# Patient Record
Sex: MALE | Race: WHITE | NOT HISPANIC OR LATINO | Employment: FULL TIME | URBAN - METROPOLITAN AREA
[De-identification: names, ages, dates, MRNs, and addresses within clinical notes are randomized per-mention and may not be internally consistent; named-entity substitution may affect disease eponyms.]

---

## 2022-12-30 ENCOUNTER — E-VISIT (OUTPATIENT)
Dept: URGENT CARE | Facility: CLINIC | Age: 32
End: 2022-12-30

## 2022-12-30 DIAGNOSIS — K04.7 DENTAL INFECTION: Primary | ICD-10-CM

## 2022-12-31 ENCOUNTER — NURSE TRIAGE (OUTPATIENT)
Dept: NURSING | Facility: CLINIC | Age: 32
End: 2022-12-31

## 2022-12-31 NOTE — PATIENT INSTRUCTIONS
Dear Dez Crouch,    We are sorry you are not feeling well. Based on the responses you provided, it is recommended that you be seen in-person in urgent care so we can better evaluate your symptoms. Please click here to find the nearest urgent care location to you.   You will not be charged for this Visit. Thank you for trusting us with your care.    Sana Mares MD

## 2022-12-31 NOTE — TELEPHONE ENCOUNTER
"C/o L sided lower tooth pain x5 days. Only tooth he has left. Swelling on L side of face. No fever. Says ibuprofen and Tylenol do not help. Cold pack helps a little. Wants virtual visit to try to get abx. However, he scheduled [facility] yesterday and note states provider informed pt he needs in-person visit for his sx (see Dr Mares's note dated 12/30) Advised see dentist w/i 24 hrs. If not able to see dental, see medical provider. It is holiday henrique after 5 pm. Gave ph# for [facility] but they are likely closed by now and tomorrow. [facility] closed now also. A few open tomorrow. They do not have a car. [facility] too far, they said. Declined ED. They are going to try to get telehealth visit w/ [facility].     Reason for Disposition    [1] Face is swollen AND [2] no fever    Additional Information    Negative: Shock suspected (e.g., cold/pale/clammy skin, too weak to stand, low BP, rapid pulse)    Negative: [1] Similar pain previously AND [2] it was from \"heart attack\"    Negative: [1] Similar pain previously AND [2] it was from \"angina\" AND [3] not relieved by nitroglycerin    Negative: Sounds like a life-threatening emergency to the triager    Negative: Chest pain    Negative: Toothache followed tooth injury    Negative: Toothache or mouth pain after tooth extraction    Negative: Canker sore (i.e., aphthous ulcer)    Negative: Tongue is very swollen and tender    Negative: [1] Face is swollen AND [2] fever    Negative: Patient sounds very sick or weak to the triager    Negative: [1] SEVERE pain (e.g., excruciating, unable to do any normal activities) AND [2] not improved 2 hours after pain medicine    Negative: Face is very swollen    Negative: Fever    Protocols used: TOOTHACHE-A-AH      "

## 2023-02-05 ENCOUNTER — HEALTH MAINTENANCE LETTER (OUTPATIENT)
Age: 33
End: 2023-02-05

## 2024-03-03 ENCOUNTER — HEALTH MAINTENANCE LETTER (OUTPATIENT)
Age: 34
End: 2024-03-03

## 2025-03-15 ENCOUNTER — HEALTH MAINTENANCE LETTER (OUTPATIENT)
Age: 35
End: 2025-03-15